# Patient Record
Sex: FEMALE | Race: AMERICAN INDIAN OR ALASKA NATIVE
[De-identification: names, ages, dates, MRNs, and addresses within clinical notes are randomized per-mention and may not be internally consistent; named-entity substitution may affect disease eponyms.]

---

## 2018-07-08 ENCOUNTER — HOSPITAL ENCOUNTER (EMERGENCY)
Dept: HOSPITAL 5 - ED | Age: 58
Discharge: LEFT BEFORE DISCHARGE | End: 2018-07-08
Payer: COMMERCIAL

## 2018-07-08 VITALS — SYSTOLIC BLOOD PRESSURE: 80 MMHG | DIASTOLIC BLOOD PRESSURE: 52 MMHG

## 2018-07-08 DIAGNOSIS — R42: Primary | ICD-10-CM

## 2018-07-08 DIAGNOSIS — Z53.21: ICD-10-CM

## 2018-07-08 LAB
ALBUMIN SERPL-MCNC: 3.4 G/DL (ref 3.9–5)
ALT SERPL-CCNC: 76 UNITS/L (ref 7–56)
BUN SERPL-MCNC: 3 MG/DL (ref 7–17)
BUN/CREAT SERPL: 6 %
CALCIUM SERPL-MCNC: 9.1 MG/DL (ref 8.4–10.2)
HCT VFR BLD CALC: 38 % (ref 30.3–42.9)
HEMOLYSIS INDEX: 9
HGB BLD-MCNC: 12.8 GM/DL (ref 10.1–14.3)
MCH RBC QN AUTO: 29 PG (ref 28–32)
MCHC RBC AUTO-ENTMCNC: 34 % (ref 30–34)
MCV RBC AUTO: 85 FL (ref 79–97)
PLATELET # BLD: 167 K/MM3 (ref 140–440)
RBC # BLD AUTO: 4.47 M/MM3 (ref 3.65–5.03)

## 2018-07-08 PROCEDURE — 85027 COMPLETE CBC AUTOMATED: CPT

## 2018-07-08 PROCEDURE — 80053 COMPREHEN METABOLIC PANEL: CPT

## 2018-07-08 PROCEDURE — 36415 COLL VENOUS BLD VENIPUNCTURE: CPT

## 2018-07-14 ENCOUNTER — HOSPITAL ENCOUNTER (EMERGENCY)
Dept: HOSPITAL 5 - ED | Age: 58
LOS: 1 days | Discharge: HOME | End: 2018-07-15
Payer: COMMERCIAL

## 2018-07-14 DIAGNOSIS — Z85.01: ICD-10-CM

## 2018-07-14 DIAGNOSIS — Z87.891: ICD-10-CM

## 2018-07-14 DIAGNOSIS — K94.23: Primary | ICD-10-CM

## 2018-07-14 DIAGNOSIS — E86.0: ICD-10-CM

## 2018-07-14 DIAGNOSIS — E86.1: ICD-10-CM

## 2018-07-14 PROCEDURE — 99284 EMERGENCY DEPT VISIT MOD MDM: CPT

## 2018-07-14 PROCEDURE — 36415 COLL VENOUS BLD VENIPUNCTURE: CPT

## 2018-07-14 PROCEDURE — 80048 BASIC METABOLIC PNL TOTAL CA: CPT

## 2018-07-14 PROCEDURE — 74018 RADEX ABDOMEN 1 VIEW: CPT

## 2018-07-14 PROCEDURE — 96361 HYDRATE IV INFUSION ADD-ON: CPT

## 2018-07-14 PROCEDURE — 96360 HYDRATION IV INFUSION INIT: CPT

## 2018-07-14 PROCEDURE — 85025 COMPLETE CBC W/AUTO DIFF WBC: CPT

## 2018-07-14 PROCEDURE — 43760: CPT

## 2018-07-15 VITALS — DIASTOLIC BLOOD PRESSURE: 56 MMHG | SYSTOLIC BLOOD PRESSURE: 91 MMHG

## 2018-07-15 LAB
BASOPHILS # (AUTO): 0 K/MM3 (ref 0–0.1)
BASOPHILS NFR BLD AUTO: 1 % (ref 0–1.8)
BUN SERPL-MCNC: 9 MG/DL (ref 7–17)
BUN/CREAT SERPL: 23 %
CALCIUM SERPL-MCNC: 9.3 MG/DL (ref 8.4–10.2)
EOSINOPHIL # BLD AUTO: 0.1 K/MM3 (ref 0–0.4)
EOSINOPHIL NFR BLD AUTO: 1.8 % (ref 0–4.3)
HCT VFR BLD CALC: 35.6 % (ref 30.3–42.9)
HEMOLYSIS INDEX: 27
HGB BLD-MCNC: 11.8 GM/DL (ref 10.1–14.3)
LYMPHOCYTES # BLD AUTO: 0.9 K/MM3 (ref 1.2–5.4)
LYMPHOCYTES NFR BLD AUTO: 27.9 % (ref 13.4–35)
MCH RBC QN AUTO: 29 PG (ref 28–32)
MCHC RBC AUTO-ENTMCNC: 33 % (ref 30–34)
MCV RBC AUTO: 86 FL (ref 79–97)
MONOCYTES # (AUTO): 0.3 K/MM3 (ref 0–0.8)
MONOCYTES % (AUTO): 8.1 % (ref 0–7.3)
PLATELET # BLD: 170 K/MM3 (ref 140–440)
RBC # BLD AUTO: 4.13 M/MM3 (ref 3.65–5.03)

## 2018-07-15 NOTE — EMERGENCY DEPARTMENT REPORT
HPI





- General


Chief Complaint: Skin/Abscess/Foreign Body


Time Seen by Provider: 07/15/18 01:40





- HPI


HPI: 








The patient is a 57-year-old female with a history of throat cancer and low 

setting G-tube placement, and who presents for evaluation of dislodgment of her 

G-tube .  She says that her G-tube came out approximately 2-3 hours ago. The 

patient denies fever, chills, night sweats, headache, chest pain, dyspnea, 

abdominal pain, nausea, vomiting, and dysuria, diarrhea, blood in the stool, 

dark tarry stool, flank pain, genital discharge, inability to pass flatus.  She 

has no pain or complaints otherwise.








ED Past Medical Hx





- Past Medical History


Hx of Cancer: Yes (Esophageal)


Additional medical history: Throat CA





- Surgical History


Additional Surgical History: Port placement, PEG placement





- Social History


Smoking Status: Former Smoker


Substance Use Type: None





ED Review of Systems


ROS: 


Stated complaint: FEEDING TUBE FELL OUT


Other details as noted in HPI





Constitutional: denies: fever


ENT: denies: throat or neck pain


Respiratory: denies: cough, shortness of breath


Cardiovascular: denies: chest pain


Endocrine: denies unexplained weight loss or gain


Gastrointestinal: denies: abdominal pain, nausea


Genitourinary: denies: dysuria


Musculoskeletal: denies: leg swelling


Skin: denies: rash


Neurological: denies: headache


Hematological/Lymphatic: denies: easy bleeding or easy bruising  


Psych: denies sadness or hopelessness











Physical Exam





- Physical Exam


Vital Signs: 


 Vital Signs











  07/15/18 07/15/18





  00:50 01:00


 


Temperature 97.7 F 97.7 F


 


Pulse Rate 73 73


 


Respiratory 16 20





Rate  


 


Blood Pressure  81/44





[Left]  


 


O2 Sat by Pulse 96 100





Oximetry  











Physical Exam: 








General: well-nourished, well-developed, no acute distress


Head: Normocephalic, atraumatic


Eyes: normal sclera


ENT: Mucous membranes are pale and dry


Neck: trachea midline, neck supple, No neck stiffness, no cervical adenopathy


Respiratory: Breath sounds equal bilaterally, no wheezing, rales, or rhonchi


Cardio: S1 and S2 present, no murmurs, rubs, gallops, capillary refill is 

delayed


Abdomen: Normoactive bowel sounds, soft abdomen, no tenderness, G-tube 

insertion site present to mid upper abdomen


Musc: No pitting edema


Skin: No rash


Neuro: no facial drooping, normal speech


Psych: Normal affect














ED Course


 Vital Signs











  07/15/18 07/15/18





  00:50 01:00


 


Temperature 97.7 F 97.7 F


 


Pulse Rate 73 73


 


Respiratory 16 20





Rate  


 


Blood Pressure  81/44





[Left]  


 


O2 Sat by Pulse 96 100





Oximetry  














ED Medical Decision Making





- Lab Data


Result diagrams: 


 07/15/18 01:13





 07/15/18 01:13





- Medical Decision Making





The patient was seen and examined by myself.  The patient is placed on a 

cardiac monitor and continuous pulse ox. On initial evaluation, the patient was 

found to be in no distress, with low blood pressure although secondary to her 

dehydration. Evaluation orders were placed.  The patient is given 1 L normal 

saline fluid bolus for treatment of her dehydration.  A new G-tube was 

inserted.  KUB with contrast was obtained and confirmed placement of G-tube. 

The patient was reevaluated and reported that their symptoms were markedly 

improved.  The patient is stable for discharge with outpatient follow-up.  The 

patient is given follow-up and return instructions.  The patient expressed 

understanding and agreed with the plan.  The patient is discharged in stable 

condition.





Critical care attestation.: 


If time is entered above; I have spent that time in minutes in the direct care 

of this critically ill patient, excluding procedure time.








ED Disposition


Clinical Impression: 


 Gastrojejunostomy tube dislodgement, Dehydration, Hypovolemia due to 

dehydration





Disposition: DC-01 TO HOME OR SELFCARE


Is pt being admited?: No


Does the pt Need Aspirin: No


Condition: Stable


Instructions:  Tube Feeding (ED)


Referrals: 


PRIMARY CARE,MD [Primary Care Provider] - 3-5 Days


Time of Disposition: 02:29

## 2018-07-15 NOTE — XRAY REPORT
FINAL REPORT



PROCEDURE:  XR G-TUBE STUDY



TECHNIQUE:  Abdominal radiograph, single supine AP view. 







HISTORY:  Post g-tube placement 







COMPARISON:  No prior studies are available for comparison.



FINDINGS:  

There is a percutaneous gastrostomy tube. Contrast is injected

into the tube which collects in the stomach. There is no leakage

or loosening. There is no gastric outlet obstruction. 



IMPRESSION:  

Percutaneous gastrostomy tube is in proper position and patent.

## 2019-06-29 ENCOUNTER — HOSPITAL ENCOUNTER (EMERGENCY)
Dept: HOSPITAL 5 - ED | Age: 59
Discharge: HOME | End: 2019-06-29
Payer: COMMERCIAL

## 2019-06-29 VITALS — DIASTOLIC BLOOD PRESSURE: 77 MMHG | SYSTOLIC BLOOD PRESSURE: 120 MMHG

## 2019-06-29 DIAGNOSIS — F17.200: ICD-10-CM

## 2019-06-29 DIAGNOSIS — K94.23: Primary | ICD-10-CM

## 2019-06-29 PROCEDURE — 99283 EMERGENCY DEPT VISIT LOW MDM: CPT

## 2019-06-29 PROCEDURE — 74018 RADEX ABDOMEN 1 VIEW: CPT

## 2019-06-29 NOTE — EMERGENCY DEPARTMENT REPORT
ED General Adult HPI





- General


Chief complaint: Tube Replacement


Stated complaint: FEEDING TUBE MALFUNCTION


Time Seen by Provider: 19 00:53


Source: patient, RN notes reviewed, old records reviewed


Mode of arrival: Ambulatory


Limitations: No Limitations





- History of Present Illness


Initial comments: 





This is a 58-year-old female.  Her past medical history includes esophageal 

cancer.  She is dependent on tube feedings.  Also has a history of malnutrition.

 Patient is visiting from [place].  She does not have a local physician that 

cares for her.  She presents to the emergency room with a complaint of 

accidental dislodgment of feeding tube.  The patient does not know what size 

Cook Islander feeding tube she has.








The patient also endorses discoloration to the bilateral lower extremities for 1

month.  She also endorses lower extremity swelling for 1 month.  She denies 

other injuries, denies other complaints.


-: Sudden


Severity scale (0 -10): 0


Consistency: now resolved


Improves with: other (a feeding tube was reinserted in the emergency room, and 

this resolved the patient's complaint of dislodged feeding tube)





- Related Data


                                Home Medications











 Medication  Instructions  Recorded  Confirmed  Last Taken


 


No Known Home Medications [No  10/18/18 10/18/18 Unknown





Reported Home Medications]    











                                    Allergies











Allergy/AdvReac Type Severity Reaction Status Date / Time


 


No Known Allergies Allergy   Verified 10/18/18 04:27














ED Review of Systems


ROS: 


Stated complaint: FEEDING TUBE MALFUNCTION


Other details as noted in HPI





Constitutional: denies: fever, malaise


ENT: denies: epistaxis


Respiratory: denies: cough


Cardiovascular: denies: chest pain


Gastrointestinal: denies: abdominal pain


Genitourinary: denies: dysuria


Musculoskeletal: other (lower extremity swelling.  Hyperpigmentation to the 

bilateral lower extremities)


Skin: rash


Neurological: denies: headache





ED Past Medical Hx





- Past Medical History


Previous Medical History?: Yes


Additional medical history: Throat CA





- Surgical History


Additional Surgical History: Port placement, PEG placement





- Social History


Smoking Status: Current Every Day Smoker


Substance Use Type: None





- Medications


Home Medications: 


                                Home Medications











 Medication  Instructions  Recorded  Confirmed  Last Taken  Type


 


No Known Home Medications [No  10/18/18 10/18/18 Unknown History





Reported Home Medications]     














ED Physical Exam





- General


Limitations: No Limitations


General appearance: alert, in no apparent distress





- Head


Head exam: Present: atraumatic, normocephalic





- Eye


Eye exam: Present: normal appearance, EOMI.  Absent: nystagmus





- ENT


ENT exam: Present: normal exam, normal orophraynx, mucous membranes moist, 

normal external ear exam





- Neck


Neck exam: Present: normal inspection, full ROM.  Absent: tenderness, 

meningismus





- Respiratory


Respiratory exam: Present: normal lung sounds bilaterally.  Absent: respiratory 

distress





- Cardiovascular


Cardiovascular Exam: Present: regular rate, normal rhythm, normal heart sounds. 

Absent: bradycardia, tachycardia, irregular rhythm, systolic murmur, diastolic 

murmur, rubs, gallop





- GI/Abdominal


GI/Abdominal exam: Present: soft, other (stoma is noted in the left lower 

quadrant, with no redness, pus or streaking.).  Absent: distended, tenderness, 

guarding, rebound, rigid, pulsatile mass





- Extremities Exam


Extremities exam: Present: normal inspection (hyperpigmentation and scaly skin 

noted to the lower extremities.  There is no redness, pus or streaking.), full 

ROM, pedal edema, other (extremities).  Absent: calf tenderness





- Back Exam


Back exam: Present: normal inspection, full ROM.  Absent: tenderness, CVA 

tenderness (R), CVA tenderness (L), paraspinal tenderness, vertebral tenderness





- Neurological Exam


Neurological exam: Present: alert, normal gait, other (Extraocular movements 

intact.  Tongue midline.  No facial droop.  Facial sensation intact to light 

touch in the V1, V2, V3 distribution bilaterally.  5 and 5 strength in 4 

extremities..  Sensation is intact to light touch in 4 extremities.)





- Psychiatric


Psychiatric exam: Present: normal affect, normal mood





- Skin


Skin exam: Present: warm, dry, intact, normal color.  Absent: rash





ED Course


                                   Vital Signs











  19





  00:35


 


Temperature 97.8 F


 


Pulse Rate 88


 


Respiratory 18





Rate 


 


Blood Pressure 120/77





[Left] 


 


O2 Sat by Pulse 99





Oximetry 














- Procedure Description


Procedures done: Stoma is cleansed with alcohol swabs and typical aseptic 

fashion.  A 16 Cook Islander feeding tube is gently inserted through the stoma, after 

liberal application of sterile jelly.  Stoma is inserted with minimal effort, 

and balloon is inflated with 7 mL of sterile saline.  Postprocedure x-ray 

confirms appropriate placement.  The patient tolerated this procedure 

adequately.





ED Medical Decision Making





- Lab Data








                                   Vital Signs











  19





  00:35


 


Temperature 97.8 F


 


Pulse Rate 88


 


Respiratory 18





Rate 


 


Blood Pressure 120/77





[Left] 


 


O2 Sat by Pulse 99





Oximetry 














- Radiology Data


Radiology results: report reviewed, image reviewed





Print Report











Referring Physician: REGAN TORO 


 


Patient Name: FARHAN GLOVER 


 


Patient ID: [id number] 


 


YOB: 1960 


 


Sex: Female 


 


Accession: [id number] 


 


Report Date: 2019 


 


Report Status: Finalized 


 


Findings


[facility] [address] [id number] 

XRay Report Signed Patient: FARHAN GLOVER MR#: [id number] : 1960 

Acct:[id number] Age/Sex: 58 / F ADM Date: 19 Loc: ED Attending Dr: 

Ordering Physician: REGAN TORO MD Date of Service: 19 Procedure(s):

 XR g-tube study Accession Number(s): [id number] cc: REGAN TORO MD Fluoro 

Time In Minutes: 0.0 PROCEDURE: Abdomen. TECHNIQUE: Portable AP supine views of 

the abdomen before and after contrast injection. HISTORY: s/p tube placement 

COMPARISONS: Abdomen 7/15/2018. FINDINGS: On the first radiograph there is a 

gastrostomy tube in the left upper quadrant. On the second radiograph contrast 

outlines the stomach and a portion of the duodenum. IMPRESSION: Satisfactory 

gastrostomy tube placement. This document is electronically signed by Regan Molina MD., 2019 02:07:02 AM ET Transcribed By: [facility] Dictated By: 

REGAN MOLINA MD Electronically Authenticated By: REGAN MOLINA MD

 Signed Date/Time: 19 0208   














- Medical Decision Making





Differential diagnosis, including but not limited to: Feeding tube replacement, 

chronic edema, hyperpigmentation





Assessment and plan: 58-year-old female with a primary complaint tube 

dislodgment.  This has been corrected.  She has secondary endorsement of lower 

extremity swelling and skin plaques and crusting, present for weeks.  She is not

 tachycardic, she is not hypoxic, her lower extremity swelling is symmetric, and

 appears to be edematous in nature.  Her lower extremity complaints.  To be 

chronic in nature.  They do not appear to represent an emergent medical 

condition.  The patient was counseled to follow up with an outpatient primary 

care doctor or gastroenterologist for outpatient management of tube feedings.  

She can follow up with the primary care doctor for her chronic lower extremity 

complaints.


Critical care attestation.: 


If time is entered above; I have spent that time in minutes in the direct care 

of this critically ill patient, excluding procedure time.








ED Disposition


Clinical Impression: 


 Encounter for feeding tube placement





Disposition: DC- TO HOME OR SELFCARE


Is pt being admited?: No


Does the pt Need Aspirin: No


Condition: Stable


Additional Instructions: 


Continue current outpatient medications.  Follow up with the primary care doctor

 for outpatient management of chronic lower extremity swelling and 

discoloration.  Follow up with a gastroenterologist for outpatient management of

 feeding tube.  Return to the emergency room right away with him, worsened or 

different symptoms, or symptoms not present on the initial emergency room 

evaluation.


Referrals: 


[facility] [Primary Care Provider] - 3-5 Days


[facility] [Provider Group] - 3-5 Days


[facility] [Provider Group] - 3-5 Days

## 2019-06-29 NOTE — XRAY REPORT
PROCEDURE: Abdomen. 

 

TECHNIQUE:  Portable AP supine views of the abdomen before and after contrast injection. 

 

HISTORY:  s/p tube placement 

 

COMPARISONS: Abdomen 7/15/2018.  

 

FINDINGS: 

 

On the first radiograph there is a gastrostomy tube in the left upper quadrant. On the second radiogr
aph contrast outlines the stomach and a portion of the duodenum. 

 

IMPRESSION:  

 

Satisfactory gastrostomy tube placement. 

 

This document is electronically signed by Regan Johnson MD., June 29 2019 02:07:02 AM ET

## 2020-02-05 ENCOUNTER — HOSPITAL ENCOUNTER (EMERGENCY)
Dept: HOSPITAL 5 - ED | Age: 60
LOS: 1 days | Discharge: HOME | End: 2020-02-06
Payer: SELF-PAY

## 2020-02-05 DIAGNOSIS — F17.200: ICD-10-CM

## 2020-02-05 DIAGNOSIS — Z85.01: ICD-10-CM

## 2020-02-05 DIAGNOSIS — Z43.1: Primary | ICD-10-CM

## 2020-02-05 PROCEDURE — 99282 EMERGENCY DEPT VISIT SF MDM: CPT

## 2020-02-05 PROCEDURE — 74018 RADEX ABDOMEN 1 VIEW: CPT

## 2020-02-05 PROCEDURE — 43762 RPLC GTUBE NO REVJ TRC: CPT

## 2020-02-06 ENCOUNTER — HOSPITAL ENCOUNTER (EMERGENCY)
Dept: HOSPITAL 5 - ED | Age: 60
Discharge: HOME | End: 2020-02-06
Payer: SELF-PAY

## 2020-02-06 VITALS — DIASTOLIC BLOOD PRESSURE: 68 MMHG | SYSTOLIC BLOOD PRESSURE: 121 MMHG

## 2020-02-06 VITALS — SYSTOLIC BLOOD PRESSURE: 99 MMHG | DIASTOLIC BLOOD PRESSURE: 55 MMHG

## 2020-02-06 DIAGNOSIS — F17.200: ICD-10-CM

## 2020-02-06 DIAGNOSIS — Z43.1: Primary | ICD-10-CM

## 2020-02-06 DIAGNOSIS — Z85.01: ICD-10-CM

## 2020-02-06 LAB
ALBUMIN SERPL-MCNC: 4.6 G/DL (ref 3.9–5)
ALT SERPL-CCNC: 17 UNITS/L (ref 7–56)
BASOPHILS # (AUTO): 0.1 K/MM3 (ref 0–0.1)
BASOPHILS NFR BLD AUTO: 1 % (ref 0–1.8)
BILIRUB DIRECT SERPL-MCNC: < 0.2 MG/DL (ref 0–0.2)
BUN SERPL-MCNC: 9 MG/DL (ref 7–17)
BUN/CREAT SERPL: 18 %
CALCIUM SERPL-MCNC: 10 MG/DL (ref 8.4–10.2)
EOSINOPHIL # BLD AUTO: 0.2 K/MM3 (ref 0–0.4)
EOSINOPHIL NFR BLD AUTO: 3.8 % (ref 0–4.3)
HCT VFR BLD CALC: 41.1 % (ref 30.3–42.9)
HEMOLYSIS INDEX: 15
HGB BLD-MCNC: 13.8 GM/DL (ref 10.1–14.3)
LYMPHOCYTES # BLD AUTO: 1.5 K/MM3 (ref 1.2–5.4)
LYMPHOCYTES NFR BLD AUTO: 26.6 % (ref 13.4–35)
MCHC RBC AUTO-ENTMCNC: 34 % (ref 30–34)
MCV RBC AUTO: 80 FL (ref 79–97)
MONOCYTES # (AUTO): 0.4 K/MM3 (ref 0–0.8)
MONOCYTES % (AUTO): 6.5 % (ref 0–7.3)
PLATELET # BLD: 229 K/MM3 (ref 140–440)
RBC # BLD AUTO: 5.14 M/MM3 (ref 3.65–5.03)

## 2020-02-06 PROCEDURE — 36415 COLL VENOUS BLD VENIPUNCTURE: CPT

## 2020-02-06 PROCEDURE — 80076 HEPATIC FUNCTION PANEL: CPT

## 2020-02-06 PROCEDURE — 80048 BASIC METABOLIC PNL TOTAL CA: CPT

## 2020-02-06 PROCEDURE — 83735 ASSAY OF MAGNESIUM: CPT

## 2020-02-06 PROCEDURE — 85025 COMPLETE CBC W/AUTO DIFF WBC: CPT

## 2020-02-06 NOTE — GASTROENTEROLOGY CONSULTATION
<TI SHAW - Last Filed: 02/06/20 10:37>





History of Present Illness





- Reason for Consult


Consult date: 02/06/20


dislodged PEG


Requesting physician: CHRISTIAN MO





- History of Present Illness


Patient is a 60 y/o female (lives in [place] but here visiting daughter) with

PMH of esophageal cancer (s/p radiation/chemo/PEG) who presented to ED for 

dislodged PEG to which GI has been consulted (was seen here yesterday for 

similar complaint with G-tube replaced; G-tube study confirmed position). 

Patient reports she with removing PEG dressing this am ~0600 when the "tube just

fell out". G-tube w/o inflated balloon noted after dislodgment per pt. She 

states she is able to tolerate small amount of PO intake but uses PEG for 

supplemental feedings. Denies fever, CP, Sob, wt loss, abd pain, N/V, or LGI 

symptoms. Upon exam, abdomen benign with prior PEG site w/o redness, swelling, 

odor, drainage, or bleeding. 








Past History


Past Medical History: other (esophageal CA)


Past Surgical History: Other (PEG, port placement)


Social history: smoking


Family history: no significant family history





Medications and Allergies


                                    Allergies











Allergy/AdvReac Type Severity Reaction Status Date / Time


 


No Known Allergies Allergy   Verified 10/18/18 04:27











                                Home Medications











 Medication  Instructions  Recorded  Confirmed  Last Taken  Type


 


No Known Home Medications [No  10/18/18 10/18/18 Unknown History





Reported Home Medications]     











Active Meds: 


medications reviewed/updated as required





Review of Systems





- Review of Systems


All systems: negative


Gastrointestinal: other (dislodged PEG), no abdominal pain, no nausea, no 

vomiting





Exam





- Constitutional


Vital Signs: 


                                        











Temp Pulse Resp BP Pulse Ox


 


 97.9 F   102 H  18   121/72   98 


 


 02/06/20 06:55  02/06/20 06:55  02/06/20 06:55  02/06/20 06:55  02/06/20 06:55











General appearance: no acute distress





- EENT


Eyes: PERRL, EOM intact


ENT: hearing intact





- Respiratory


Respiratory effort: normal





- Cardiovascular


Rhythm: regular





- Gastrointestinal


General gastrointestinal: Present: soft, non-tender, non-distended, normal bowel

sounds





- Neurologic


Neurological: alert and oriented x3





- Labs


CBC & Chem 7: 


                                 02/06/20 09:59





Lab Results: 


                         Laboratory Results - last 24 hr











  02/06/20





  09:59


 


WBC  5.6


 


RBC  5.14 H


 


Hgb  13.8


 


Hct  41.1


 


MCV  80


 


MCH  27 L


 


MCHC  34


 


RDW  13.8


 


Plt Count  229


 


Lymph % (Auto)  26.6


 


Mono % (Auto)  6.5


 


Eos % (Auto)  3.8


 


Baso % (Auto)  1.0


 


Lymph #  1.5


 


Mono #  0.4


 


Eos #  0.2


 


Baso #  0.1


 


Seg Neutrophils %  62.1


 


Seg Neutrophils #  3.5














Assessment and Plan


1.dislodged PEG


 2.H/o esophageal CA (s/p radiation/chemo)





-stoma w/o s/s of infection or bleeding


-PEG replaced per Dr. Sherman with 16Fr G-tube using aseptic technique. Patient 

tolerated well. Position confirmed per auscultation. 


-split gauze dressing PRN


-daily PEG care


-okay to resume TFs


-no further recommendations per GI standpoint


-patient okay to be discharged home with f/u with primary GI upon returning home


-will sign off, please call if needed








<ROBERTO CARLOS SHERMAN - Last Filed: 02/06/20 10:51>





Exam





- Constitutional


Vital Signs: 


                                        











Temp Pulse Resp BP Pulse Ox


 


 97.9 F   102 H  18   121/72   98 


 


 02/06/20 06:55  02/06/20 06:55  02/06/20 06:55  02/06/20 06:55  02/06/20 06:55














- Labs


CBC & Chem 7: 


                                 02/06/20 09:59





                                 02/06/20 09:59


Lab Results: 


                         Laboratory Results - last 24 hr











  02/06/20 02/06/20





  09:59 09:59


 


WBC  5.6 


 


RBC  5.14 H 


 


Hgb  13.8 


 


Hct  41.1 


 


MCV  80 


 


MCH  27 L 


 


MCHC  34 


 


RDW  13.8 


 


Plt Count  229 


 


Lymph % (Auto)  26.6 


 


Mono % (Auto)  6.5 


 


Eos % (Auto)  3.8 


 


Baso % (Auto)  1.0 


 


Lymph #  1.5 


 


Mono #  0.4 


 


Eos #  0.2 


 


Baso #  0.1 


 


Seg Neutrophils %  62.1 


 


Seg Neutrophils #  3.5 


 


Sodium   141


 


Potassium   3.7


 


Chloride   102.7


 


Carbon Dioxide   22


 


Anion Gap   20


 


BUN   9


 


Creatinine   0.5 L


 


Estimated GFR   > 60


 


BUN/Creatinine Ratio   18


 


Glucose   101 H


 


Calcium   10.0


 


Magnesium   1.90


 


Total Bilirubin   0.40


 


AST   18


 


ALT   17


 


Alkaline Phosphatase   104


 


Total Protein   7.8


 


Albumin   4.6


 


Albumin/Globulin Ratio   1.4














Assessment and Plan





Patient seen and examined on 02/06/2020. Agree with A/P as stated. 





Bedside PEG replacement performed. 





Previous PEG site stoma examined and w/o s/o infection or bleeding. A new 16F 

replacement tube placed using aseptic technique. 7 cc saline placed to inflate 

the internal balloon. Auscultation of gurgling noise over the upper abdomen, 

confirmation of position. No immediate complications noted. Patient tolerated 

the procedure well.

## 2020-02-06 NOTE — EMERGENCY DEPARTMENT REPORT
ED General Adult HPI





- General


Chief complaint: Tube Replacement


Stated complaint: STOMACH PAIN FEEDING TUBE


Time Seen by Provider: 02/06/20 01:36


Source: patient


Mode of arrival: Ambulatory


Limitations: No Limitations





- History of Present Illness


Initial comments: 





58 yo F, hx esophageal cancer presents to ED because PEG tube came out 30 

minutes prior to arrival.


-: minutes(s) (30)


Location: abdomen


Associated Symptoms: denies other symptoms





- Related Data


                                Home Medications











 Medication  Instructions  Recorded  Confirmed  Last Taken


 


No Known Home Medications [No  10/18/18 10/18/18 Unknown





Reported Home Medications]    











                                    Allergies











Allergy/AdvReac Type Severity Reaction Status Date / Time


 


No Known Allergies Allergy   Verified 10/18/18 04:27














ED Review of Systems


ROS: 


Stated complaint: STOMACH PAIN FEEDING TUBE


Other details as noted in HPI





Comment: All other systems reviewed and negative


Constitutional: denies: chills, fever


Gastrointestinal: denies: abdominal pain





ED Past Medical Hx





- Past Medical History


Previous Medical History?: Yes


Additional medical history: Throat CA





- Surgical History


Past Surgical History?: Yes


Additional Surgical History: Port placement, PEG placement





- Social History


Smoking Status: Current Some Day Smoker


Substance Use Type: None





- Medications


Home Medications: 


                                Home Medications











 Medication  Instructions  Recorded  Confirmed  Last Taken  Type


 


No Known Home Medications [No  10/18/18 10/18/18 Unknown History





Reported Home Medications]     














ED Physical Exam





- General


Limitations: No Limitations


General appearance: alert, in no apparent distress





- Head


Head exam: Present: atraumatic, normocephalic





- Eye


Eye exam: Present: normal appearance





- ENT


ENT exam: Present: mucous membranes moist





- Neck


Neck exam: Present: normal inspection





- Respiratory


Respiratory exam: Present: normal lung sounds bilaterally.  Absent: respiratory 

distress





- Cardiovascular


Cardiovascular Exam: Present: regular rate, normal rhythm





- GI/Abdominal


GI/Abdominal exam: Present: soft, other (gastrostomy present in LUQ).  Absent: 

distended, tenderness





- Extremities Exam


Extremities exam: Present: normal inspection





- Neurological Exam


Neurological exam: Present: alert, oriented X3





- Psychiatric


Psychiatric exam: Present: normal affect, normal mood





- Skin


Skin exam: Present: warm, dry, intact, normal color





ED Course


                                   Vital Signs











  02/05/20 02/06/20





  22:46 01:40


 


Temperature 97.9 F 98.3 F


 


Pulse Rate 90 85


 


Respiratory 20 16





Rate  


 


Blood Pressure 104/66 


 


Blood Pressure  112/60





[Left]  


 


O2 Sat by Pulse 98 98





Oximetry  














- Feeding Tube Replacement


Reason for Replacement: fell out


Initial Tube Inserted: greater than 4 weeks


Type of Tube: gastrostomy


Use of Tube: medications and feeding


Insertion Site Prior to Procedure: clean


Tube Used for Reinsertion: other (16F PEG tube)


Mauritian Tube Size (F): 16


Balloon Size (mls): 3


Verification of Placement: gastrograffin injection


Tube Secured by: G-tube attachment device


Patient Tolerated Procedure: well


Critical care attestation.: 


If time is entered above; I have spent that time in minutes in the direct care 

of this critically ill patient, excluding procedure time.








ED Disposition


Clinical Impression: 


 Encounter for feeding tube placement





Disposition: DC-01 TO HOME OR SELFCARE


Is pt being admited?: No


Condition: Stable


Instructions:  How to Use and Care for Your PEG Tube (ED)


Referrals: 


PRIMARY CARE,MD [Referring] - 3-5 Days


Time of Disposition: 03:01

## 2020-02-06 NOTE — EMERGENCY DEPARTMENT REPORT
<CHRISTIAN MO - Last Filed: 02/06/20 10:16>





ED General Adult HPI





- General


Chief complaint: Tube Replacement


Stated complaint: FEEDING TUBE CAME OUT


Time Seen by Provider: 02/06/20 09:28





- Related Data


                                Home Medications











 Medication  Instructions  Recorded  Confirmed  Last Taken


 


No Known Home Medications [No  10/18/18 10/18/18 Unknown





Reported Home Medications]    











                                    Allergies











Allergy/AdvReac Type Severity Reaction Status Date / Time


 


No Known Allergies Allergy   Verified 10/18/18 04:27














ED Past Medical Hx





- Medications


Home Medications: 


                                Home Medications











 Medication  Instructions  Recorded  Confirmed  Last Taken  Type


 


No Known Home Medications [No  10/18/18 10/18/18 Unknown History





Reported Home Medications]     














ED Disposition


Clinical Impression: 


 Encounter for feeding tube placement, Complication of feeding tube





Disposition: DC-01 TO HOME OR SELFCARE


Condition: Stable


Instructions:  How to Use and Care for Your PEG Tube (ED)


Referrals: 


[facility] [Provider Group] - 3-5 Days





<FATEMEH GARCIA - Last Filed: 02/06/20 11:34>





ED General Adult HPI





- General


Source: patient


Mode of arrival: Ambulatory


Limitations: No Limitations





- History of Present Illness


Initial comments: 





59-year-old asthmatic female with a reported past medical history of throat 

cancer utilization of feeding tube present emergency department complaining of a

feeding tube dislodgment of unspecified time.  She states that she she had the 

tube placed yesterday or very early morning and when she gotten home and 

attempted to remove the tape which had dislodged feeding tube.  She reports 

having had a history of feeding tube however the duration was not normal and she

had it replaced initially anywhere from 4 or more weeks ago.  She reports no 

abdominal pain.  Portable fever, chills, sweats or chest pain no diarrhea no 

nausea no vomiting.


-: Sudden


Radiation: non-radiation


Improves with: none


Worsens with: none


Associated Symptoms: denies other symptoms


Treatments Prior to Arrival: none





ED Review of Systems


ROS: 


Stated complaint: FEEDING TUBE CAME OUT


Other details as noted in HPI





Comment: All other systems reviewed and negative





ED Past Medical Hx





- Past Medical History


Previous Medical History?: Yes


Additional medical history: Throat CA





- Surgical History


Past Surgical History?: Yes


Additional Surgical History: Port placement, PEG placement





- Social History


Smoking Status: Light Tobacco Smoker


Substance Use Type: None





ED Physical Exam





- General


Limitations: No Limitations


General appearance: alert, in no apparent distress





- Head


Head exam: Present: atraumatic, normocephalic





- Eye


Eye exam: Present: normal appearance, PERRL, EOMI


Pupils: Present: normal accommodation





- ENT


ENT exam: Present: mucous membranes moist





- Neck


Neck exam: Present: normal inspection





- Respiratory


Respiratory exam: Present: normal lung sounds bilaterally.  Absent: respiratory 

distress





- Cardiovascular


Cardiovascular Exam: Present: regular rate, normal rhythm.  Absent: systolic 

murmur, diastolic murmur, rubs, gallop





- GI/Abdominal


GI/Abdominal exam: Present: soft, normal bowel sounds





- Extremities Exam


Extremities exam: Present: normal inspection





- Back Exam


Back exam: Present: normal inspection.  Absent: CVA tenderness (R), CVA 

tenderness (L)





- Neurological Exam


Neurological exam: Present: alert, oriented X3, CN II-XII intact





- Psychiatric


Psychiatric exam: Present: normal affect, normal mood





- Skin


Skin exam: Present: warm, dry, intact, normal color.  Absent: rash





ED Course


                                   Vital Signs











  02/06/20





  06:55


 


Temperature 97.9 F


 


Pulse Rate 102 H


 


Respiratory 18





Rate 


 


Blood Pressure 121/72


 


O2 Sat by Pulse 98





Oximetry 














- Reevaluation(s)


Reevaluation #1: 





02/06/20 09:18


Discussed the case with Dr. Mo the attending with attempted to place the f

eeding tube was unsuccessful on 2 attempts.  A lot of resistance was met.  No 

bleeding no wound discharge plan is to consult with GI for further evaluation 

and treatment options





- Consultations


Consultation #1: 





02/06/20 09:34


Call back from her neurologist whom has been made aware of inability to replace 

the feeding tube.  The patient's name and phone number was provided along with a

history of the plan is for them to come see the patient at bedside to devise a 

plan to replace the feeding tube





ED Medical Decision Making





- Lab Data


Result diagrams: 


                                 02/06/20 09:59





                                 02/06/20 09:59





- Medical Decision Making





4-year-old female is status post PEG tube replacement earlier this morning or 

yesterday.  Presents emergency department due to a accidental dislodgment.  The 

ED physician Dr. Mo try to replace the feeding tube was unsuccessful at the 2

attempts.  Gastric neurology was consulted and was able to reintroduce the PEG 

tube to his proper location and inflated the cuff.  It is possible that the 

previous cuff was not inflated which may have led to the dislodgment.  The 

procedure was tolerated well with no complications plan is to discharge home 

with follow-up


Critical care attestation.: 


If time is entered above; I have spent that time in minutes in the direct care 

of this critically ill patient, excluding procedure time.








ED Disposition


Is pt being admited?: No


Does the pt Need Aspirin: No

## 2020-02-06 NOTE — XRAY REPORT
ABDOMEN 2 VIEW(S)



INDICATION / CLINICAL INFORMATION:

PEG replacement.



COMPARISON: 

G-tube study from 6/29/2019



FINDINGS:



TUBES / LINES: Gastric tube was injected with 30 mL of Gastrografin. There is intraluminal contrast w
ithin the stomach and duodenum with no contrast extravasation.

BOWEL GAS PATTERN: Moderate colonic stool burden suggesting constipation. 

FREE AIR / EXTRALUMINAL GAS: None seen.



ADDITIONAL FINDINGS: No significant additional findings.



IMPRESSION:

1. Intraluminal tip of the gastric tube.

2. Findings of constipation.



Signer Name: Duy Jarquin MD 

Signed: 2/6/2020 2:50 AM

 Workstation Name: [id number]

## 2020-08-19 ENCOUNTER — HOSPITAL ENCOUNTER (EMERGENCY)
Dept: HOSPITAL 5 - ED | Age: 60
Discharge: HOME | End: 2020-08-19
Payer: SELF-PAY

## 2020-08-19 VITALS — SYSTOLIC BLOOD PRESSURE: 122 MMHG | DIASTOLIC BLOOD PRESSURE: 72 MMHG

## 2020-08-19 DIAGNOSIS — Z43.1: Primary | ICD-10-CM

## 2020-08-19 DIAGNOSIS — F17.200: ICD-10-CM

## 2020-08-19 PROCEDURE — 99282 EMERGENCY DEPT VISIT SF MDM: CPT

## 2020-08-19 NOTE — EMERGENCY DEPARTMENT REPORT
ED General Adult HPI





- General


Chief complaint: Tube Replacement


Stated complaint: DISPLACED FEEDING TUBE


Time Seen by Provider: 08/19/20 12:47


Source: patient


Mode of arrival: Ambulatory


Limitations: No Limitations





- History of Present Illness


Initial comments: 





Patient is 60 years old female presented to the ER stating that her G-tube fell 

off yesterday but however she kept it inside.  Patient denied any other 

symptoms.  Patient denied any fever or chills.  Patient denied any discharge 

around the G-tube.


Severity scale (0 -10): 1





- Related Data


                                Home Medications











 Medication  Instructions  Recorded  Confirmed  Last Taken


 


No Known Home Medications [No  10/18/18 10/18/18 Unknown





Reported Home Medications]    











                                    Allergies











Allergy/AdvReac Type Severity Reaction Status Date / Time


 


No Known Allergies Allergy   Verified 10/18/18 04:27














ED Review of Systems


ROS: 


Stated complaint: DISPLACED FEEDING TUBE


Other details as noted in HPI





Comment: All other systems reviewed and negative


Constitutional: denies: chills, fever


Cardiovascular: denies: chest pain


Gastrointestinal: denies: abdominal pain, nausea, vomiting


Neurological: denies: headache





ED Past Medical Hx





- Past Medical History


Previous Medical History?: Yes


Additional medical history: Throat CA





- Surgical History


Past Surgical History?: Yes


Additional Surgical History: Port placement, PEG placement





- Social History


Smoking Status: Current Some Day Smoker





- Medications


Home Medications: 


                                Home Medications











 Medication  Instructions  Recorded  Confirmed  Last Taken  Type


 


No Known Home Medications [No  10/18/18 10/18/18 Unknown History





Reported Home Medications]     














ED Physical Exam





- General


Limitations: No Limitations


General appearance: alert, in no apparent distress





- Head


Head exam: Present: atraumatic, normocephalic, normal inspection





- ENT


ENT exam: Present: normal exam, normal orophraynx, mucous membranes moist





- Respiratory


Respiratory exam: Present: normal lung sounds bilaterally





- Cardiovascular


Cardiovascular Exam: Present: regular rate, normal rhythm, normal heart sounds





- GI/Abdominal


GI/Abdominal exam: Present: soft, normal bowel sounds.  Absent: distended, 

tenderness, guarding, rebound, rigid, mass, bruit, pulsatile mass





- Extremities Exam


Extremities exam: Present: normal inspection, full ROM, normal capillary refill





- Neurological Exam


Neurological exam: Present: alert, oriented X3, CN II-XII intact





- Skin


Skin exam: Present: warm, intact, normal color





ED Course





                                   Vital Signs











  08/19/20 08/19/20 08/19/20





  05:28 11:18 13:18


 


Temperature 98.1 F  98.6 F


 


Pulse Rate 100 H 78 73


 


Respiratory 18 16 18





Rate   


 


Blood Pressure 125/76 129/70 


 


Blood Pressure   122/76





[Right]   


 


O2 Sat by Pulse 99 98 100





Oximetry   














- Feeding Tube Replacement


Reason for Replacement: fell out


Initial Tube Inserted: greater than 4 weeks


Type of Tube: gastrostomy


Use of Tube: medications and feeding


Insertion Site Prior to Procedure: clean


Tube Used for Reinsertion: other (16 Danish)


Verification of Placement: auscultation


Tube Secured by: tape/dressing


Patient Tolerated Procedure: well, no complications


Additional Comments: 





G-tube replaced by me using a 16 Danish tube size.  Patient tolerated procedure 

well with no complication.





ED Medical Decision Making





- Medical Decision Making





Patient is 60 years old female presented to the ER stating that her G-tube fell 

off yesterday but however she kept it inside.  Patient denied any other 

symptoms.  Patient denied any fever or chills.  Patient denied any discharge 

around the G-tube.





G-tube replaced by me using a 16 Danish tube size.  Patient tolerated procedure 

well with no complication.


Critical care attestation.: 


If time is entered above; I have spent that time in minutes in the direct care 

of this critically ill patient, excluding procedure time.








ED Disposition


Clinical Impression: 


 Encounter for feeding tube placement





Disposition: DC-01 TO HOME OR SELFCARE


Is pt being admited?: No


Condition: Stable


Instructions:  How to Use and Care for Your PEG Tube (ED)


Referrals: 


PRIMARY CARE,MD [Primary Care Provider] - 3-5 Days

## 2021-07-29 ENCOUNTER — HOSPITAL ENCOUNTER (EMERGENCY)
Dept: HOSPITAL 5 - ED | Age: 61
Discharge: HOME | End: 2021-07-29
Payer: MEDICAID

## 2021-07-29 VITALS — DIASTOLIC BLOOD PRESSURE: 76 MMHG | SYSTOLIC BLOOD PRESSURE: 111 MMHG

## 2021-07-29 DIAGNOSIS — K94.29: Primary | ICD-10-CM

## 2021-07-29 PROCEDURE — 43762 RPLC GTUBE NO REVJ TRC: CPT

## 2021-07-29 PROCEDURE — 74018 RADEX ABDOMEN 1 VIEW: CPT

## 2021-07-29 PROCEDURE — 99283 EMERGENCY DEPT VISIT LOW MDM: CPT

## 2021-07-29 NOTE — XRAY REPORT
X-RAY G-TUBE STUDY



INDICATION / CLINICAL INFORMATION:

G tube placement.



COMPARISON: 

2/6/2020



FINDINGS:



TUBES / LINES: Gastric tube balloon projects over the stomach. 30 mL of Gastrografin was injected thr
ough the tube. There is intraluminal contrast seen in the stomach and proximal small bowel. No contra
st extravasation. Moderate stool burden seen throughout the colon.

BOWEL GAS PATTERN: No significant abnormality. 

FREE AIR / EXTRALUMINAL GAS: None seen.



ADDITIONAL FINDINGS: No significant additional findings.



IMPRESSION:

1. Intraluminal position of gastric tube.

2. Findings of constipation.



Signer Name: Riley Avila MD 

Signed: 7/29/2021 3:20 PM

Workstation Name: [id number]

## 2021-07-29 NOTE — EMERGENCY DEPARTMENT REPORT
HPI





- General


Chief Complaint: Medical Clearance


Time Seen by Provider: 07/29/21 14:36





- HPI


HPI: 





This is a 60-year-old -American female presents to the emergency 

department with a complaint of having her feeding tube fall out about 2 days 

ago.  The patient has had a PEG tube in place for about 1 year secondary to 

history of throat cancer.  She has been seen here multiple times in the past for

issues with her G-tube.  She says that she waited for about 2 days to come in 

because she did not have a ride.  She denies any abdominal pain, fever, purulent

discharge from the PEG tube site.  Patient also, secondarily, complains of a 1 

month history of ringing in her ears.  She denies any ear pain, decreased 

hearing.  She has not taken anything for symptoms prior to presentation today.





ED Past Medical Hx





- Past Medical History


Hx of Cancer: Yes


Additional medical history: Throat CA





- Surgical History


Additional Surgical History: Port placement, PEG placement





- Social History


Smoking Status: Current Some Day Smoker


Substance Use Type: None





- Medications


Home Medications: 


                                Home Medications











 Medication  Instructions  Recorded  Confirmed  Last Taken  Type


 


No Known Home Medications [No  10/18/18 10/18/18 Unknown History





Reported Home Medications]     














ED Review of Systems


ROS: 


Stated complaint: TUBE FEEDING FELL OUT


Other details as noted in HPI





Comment: All other systems reviewed and negative


Constitutional: denies: chills, fever


Eyes: denies: eye pain, vision change


ENT: other (1 month hx of ear ringing).  denies: ear pain, hearing loss


Respiratory: denies: cough, shortness of breath


Cardiovascular: denies: chest pain, palpitations


Gastrointestinal: denies: abdominal pain, vomiting


Genitourinary: denies: dysuria, discharge


Musculoskeletal: denies: back pain, arthralgia


Skin: denies: rash, lesions


Neurological: denies: headache, weakness





Physical Exam





- Physical Exam


Vital Signs: 


                                   Vital Signs











  07/29/21 07/29/21





  07:46 11:41


 


Temperature 98.5 F 97.7 F


 


Pulse Rate 83 82


 


Respiratory 20 18





Rate  


 


Blood Pressure 121/68 111/76


 


O2 Sat by Pulse 100 99





Oximetry  











Physical Exam: 





GENERAL: The patient is well-developed well-nourished.


HENT: Normocephalic.  Atraumatic.    Patient has moist mucous membranes.  Normal

appearing bilateral external ear canals and tympanic membranes.


EYES: Extraocular motions are intact. 


NECK: Supple. Trachea is midline.


CHEST/LUNGS: Clear to auscultation.  There is no respiratory distress noted.


HEART/CARDIOVASCULAR: Regular.  There is no tachycardia.  There is no murmur.


ABDOMEN: Abdomen is soft, nontender.  Patient has normal bowel sounds. 


SKIN: Skin is warm and dry.  There is a small circular opening, where the 

patient previously had the feeding tube, that appears clean without surrounding 

erythema or purulent discharge.


NEURO: The patient is awake, alert, and oriented.  The patient is cooperative.  

The patient has no focal neurologic deficits.  Normal speech.


MUSCULOSKELETAL: There is no tenderness or deformity.  There is no limitation 

range of motion.  





ED Course


                                   Vital Signs











  07/29/21 07/29/21





  07:46 11:41


 


Temperature 98.5 F 97.7 F


 


Pulse Rate 83 82


 


Respiratory 20 18





Rate  


 


Blood Pressure 121/68 111/76


 


O2 Sat by Pulse 100 99





Oximetry  














- Feeding Tube Replacement


Reason for Replacement: fell out


Initial Tube Inserted: greater than 4 weeks


Type of Tube: gastrostomy


Use of Tube: medications and feeding


Insertion Site Prior to Procedure: clean


Tube Used for Reinsertion: GREGORIO


Filipino Tube Size (F): 18


Balloon Size (mls): 8


Verification of Placement: gastrograffin injection


Patient Tolerated Procedure: well





ED Medical Decision Making





- Medical Decision Making





The patient's primary reason for her visit was that her feeding tube came out 

about 2 days ago.  I was able to easily replace the 18 Filipino feeding tube with 

a GREGORIO tube and G-tube study showed appropriate placement.





Patient also complains of a 1 month history of some nonspecific ringing of the 

ears.  Normal-appearing external ear canals and tympanic membranes.  She denies 

any decreased hearing.  Patient has been given outpatient referrals for primary 

care and otolaryngology.


Critical Care Time: No


Critical care attestation.: 


If time is entered above; I have spent that time in minutes in the direct care 

of this critically ill patient, excluding procedure time.








ED Disposition


Clinical Impression: 


 PEG tube malfunction, Encounter for feeding tube placement, Ringing in ears





Disposition: DC-01 TO HOME OR SELFCARE


Is pt being admited?: No


Condition: Stable


Instructions:  How to Care for a Feeding Tube, Gastrostomy Tube Replacement


Additional Instructions: 


Please follow-up with a primary care physician.  I have given you a referral for

a local primary care physician, Dr. Inman, and a primary care clinic, [facility].





I have also given you a referral for a local ENT, Dr. Stevens, to follow-up 

regarding the 1 month history of ringing in your ears.





Return to the emergency department with any worsening of your symptoms, new or 

concerning symptoms not addressed during this current emergency department 

visit, or with any acute distress.


Referrals: 


PRIMARY CARE,MD [Primary Care Provider] - 2-3 Days


JOSHUA INMAN MD [Staff Physician] - 2-3 Days


JONO STEVENS MD [Staff Physician] - 2-3 Days


[facility] [Provider Group] - 2-3 Days


Time of Disposition: 15:16

## 2022-06-12 ENCOUNTER — HOSPITAL ENCOUNTER (EMERGENCY)
Dept: HOSPITAL 5 - ED | Age: 62
LOS: 1 days | Discharge: HOME | End: 2022-06-13
Payer: SELF-PAY

## 2022-06-12 DIAGNOSIS — K94.23: Primary | ICD-10-CM

## 2022-06-12 DIAGNOSIS — Z85.9: ICD-10-CM

## 2022-06-12 DIAGNOSIS — F17.200: ICD-10-CM

## 2022-06-12 PROCEDURE — 99282 EMERGENCY DEPT VISIT SF MDM: CPT

## 2022-06-12 PROCEDURE — 74018 RADEX ABDOMEN 1 VIEW: CPT

## 2022-06-12 PROCEDURE — 99283 EMERGENCY DEPT VISIT LOW MDM: CPT

## 2022-06-12 NOTE — EMERGENCY DEPARTMENT REPORT
ED General Adult HPI





- General


Chief complaint: Tube Replacement


Stated complaint: FEEFING TUB FELL OUT


Time Seen by Provider: 06/12/22 22:47


Source: patient


Mode of arrival: Ambulatory


Limitations: No Limitations





- History of Present Illness


Initial comments: 





Patient is 61 years old female presented to the ER stating that her G-tube fell 

out.  Patient denied any other complaint.


Severity scale (0 -10): 0





- Related Data


                                Home Medications











 Medication  Instructions  Recorded  Confirmed  Last Taken


 


No Known Home Medications [No  10/18/18 10/18/18 Unknown





Reported Home Medications]    











                                    Allergies











Allergy/AdvReac Type Severity Reaction Status Date / Time


 


No Known Allergies Allergy   Verified 07/29/21 07:41














ED Review of Systems


ROS: 


Stated complaint: FEEFING TUB FELL OUT


Other details as noted in HPI





Comment: All other systems reviewed and negative


Constitutional: denies: chills, fever


Respiratory: denies: cough, shortness of breath, SOB with exertion


Cardiovascular: denies: chest pain, palpitations


Gastrointestinal: denies: abdominal pain, nausea


Neurological: denies: headache, weakness





ED Past Medical Hx





- Past Medical History


Previous Medical History?: Yes


Hx of Cancer: Yes


Additional medical history: Throat CA, feeding tube





- Surgical History


Past Surgical History?: Yes


Additional Surgical History: Port placement, PEG placement





- Social History


Smoking Status: Current Some Day Smoker


Substance Use Type: None





- Medications


Home Medications: 


                                Home Medications











 Medication  Instructions  Recorded  Confirmed  Last Taken  Type


 


No Known Home Medications [No  10/18/18 10/18/18 Unknown History





Reported Home Medications]     














ED Physical Exam





- General


Limitations: No Limitations


General appearance: alert, in no apparent distress





- Head


Head exam: Present: atraumatic, normocephalic, normal inspection





- Eye


Eye exam: Present: normal appearance





- ENT


ENT exam: Present: normal exam, normal orophraynx, mucous membranes moist





- Respiratory


Respiratory exam: Present: normal lung sounds bilaterally





- Cardiovascular


Cardiovascular Exam: Present: regular rate, normal rhythm, normal heart sounds





- GI/Abdominal


GI/Abdominal exam: Present: soft, normal bowel sounds.  Absent: distended, 

tenderness, guarding, rebound, rigid, organomegaly, mass, bruit, pulsatile mass,

hernia





- Extremities Exam


Extremities exam: Present: normal inspection, normal capillary refill





- Back Exam


Back exam: Present: normal inspection.  Absent: CVA tenderness (R), CVA 

tenderness (L)





- Neurological Exam


Neurological exam: Present: alert, oriented X3, CN II-XII intact





- Psychiatric


Psychiatric exam: Present: normal mood





- Skin


Skin exam: Present: warm, intact, normal color





ED Course





                                   Vital Signs











  06/12/22





  10:47


 


Temperature 97.5 F L


 


Pulse Rate 83


 


Respiratory 18





Rate 


 


Blood Pressure 97/67





[Right] 


 


O2 Sat by Pulse 98





Oximetry 














ED Medical Decision Making





- Medical Decision Making





Patient is 61 years old female presented to the ER stating that her G-tube fell 

out.  Patient denied any other complaint.





20 Colombian G-tube placed by me.  No complication.  Placement confirmed with G-

tube Gastrografin study.


Critical care attestation.: 


If time is entered above; I have spent that time in minutes in the direct care 

of this critically ill patient, excluding procedure time.








ED Disposition


Clinical Impression: 


 Gastrostomy tube dysfunction





Disposition: 01 HOME / SELF CARE / HOMELESS


Is pt being admited?: No


Condition: Stable


Instructions:  PEG Tube Home Guide, Easy-to-Read


Referrals: 


PRIMARY CARE,MD [Referring] - 3-5 Days

## 2022-06-13 VITALS — SYSTOLIC BLOOD PRESSURE: 106 MMHG | DIASTOLIC BLOOD PRESSURE: 72 MMHG

## 2022-06-13 NOTE — XRAY REPORT
ABDOMEN 2 VIEWS



INDICATION / CLINICAL INFORMATION: G-tube replacement..



COMPARISON: None available.



TECHNIQUE: Supine AP abdomen was acquired prior to and following administration of 30 cc Gastrografin
 via existing G-tube.



FINDINGS:



TUBES / LINES: Percutaneous gastrostomy tube is present with balloon positioned within the mid fundus
. Contrast extends through the first portion of the duodenum.

BOWEL GAS PATTERN: No significant abnormality. 

FREE AIR / EXTRALUMINAL GAS: None seen.



ADDITIONAL FINDINGS: No significant additional findings.



CHEST: Visualized chest shows no significant abnormality.



IMPRESSION:

1. Intragastric placement of G-tube.



Signer Name: Jonathan Keller II, MD 

Signed: 6/12/2022 11:55 PM

Workstation Name: [id number]